# Patient Record
Sex: MALE
[De-identification: names, ages, dates, MRNs, and addresses within clinical notes are randomized per-mention and may not be internally consistent; named-entity substitution may affect disease eponyms.]

---

## 2022-07-08 PROBLEM — Z00.00 ENCOUNTER FOR PREVENTIVE HEALTH EXAMINATION: Status: ACTIVE | Noted: 2022-07-08

## 2022-07-14 ENCOUNTER — APPOINTMENT (OUTPATIENT)
Dept: NEUROSURGERY | Facility: CLINIC | Age: 67
End: 2022-07-14

## 2022-07-14 DIAGNOSIS — M54.50 LOW BACK PAIN, UNSPECIFIED: ICD-10-CM

## 2022-07-14 DIAGNOSIS — G89.29 LOW BACK PAIN, UNSPECIFIED: ICD-10-CM

## 2022-07-14 DIAGNOSIS — Z98.1 ARTHRODESIS STATUS: ICD-10-CM

## 2022-07-14 PROCEDURE — EDU01: CPT

## 2022-07-14 NOTE — REASON FOR VISIT
[Home] : at home, [unfilled] , at the time of the visit. [Medical Office: ([facility])___] : at the medical office located in  [Patient] : the patient

## 2022-07-15 PROBLEM — M54.50 CHRONIC BILATERAL LOW BACK PAIN WITHOUT SCIATICA: Status: ACTIVE | Noted: 2022-07-15

## 2022-07-15 PROBLEM — Z98.1 HISTORY OF FUSION OF THORACIC SPINE: Status: ACTIVE | Noted: 2022-07-15

## 2022-07-15 RX ORDER — ACETAMINOPHEN 500 MG
500 TABLET ORAL
Refills: 0 | Status: ACTIVE | COMMUNITY

## 2022-07-15 RX ORDER — GABAPENTIN 300 MG/1
300 CAPSULE ORAL
Refills: 0 | Status: ACTIVE | COMMUNITY

## 2022-07-15 NOTE — DATA REVIEWED
[de-identified] : Thoracic 6/2022 (care stream) [de-identified] : Thoracic and lumbar 6/2022 (care stream)

## 2022-07-15 NOTE — HISTORY OF PRESENT ILLNESS
[> 3 months] : more  than 3 months [Pain] : pain [Weakness] : weakness [Numbness] : numbness [Other:___] : [unfilled] [Worsening] : worsening [___ yrs] : [unfilled] year(s) ago [Sitting] : sitting [Daily] : ~He/She~ states the symptoms seem to be occuring daily [Bending] : worsened by bending [Lifting] : worsened by lifting [Prolonged Standing] : worsened by prolonged standing [Standing] : worsened by standing [Walking] : worsened by walking [Weight Bearing] : worsened by weight bearing [Recumbency] : relieved by recumbency [Rest] : relieved by rest [FreeTextEntry1] : I conducted a remote educational consult with RODRICK Durbin on 07/15/2022. I explained that I am only able to provide an educational consult and not render treatment as I am not licensed in the state in which [place] is located. A written informed consent for the educational consult was obtained and is included in the EHR. RODRICK Durbin is informed that there would be a $250 cost associated with the conduct of the remote educational consult. Video education consultation for back and leg pain [de-identified] : This is a very pleasant 66 year old male who reports rupturing a lumbar disc in 1984. At this time he had a laminectomy/diskectomy done. He did well until approximately 1994 at which time he developed worsening low back pain , mid back pain and leg pain. At this time He had a Thoracic fusion. He reports that the back pain and leg pain initially improved\par  \par In 2019 he under went a 2 stage revision due to unstable fusion. Stage one consisted of ALIF and stage 2 was a PLIF. Patient reports that he woke up in the recovery room in excruciating pain and from there he was taken back to the operating room immediately where he reports " a screw was removed".\par \par He woke up with improved back pain but minimal leg pain improvement.\par \par He reports that this has greatly impacted his daily activities as prior to the 2 stage fusion he was able to swim 2500 meters per day and ride his bike for miles. Now he is only able to swim 1200 meters and bike for a mile or two. He also notes that if he lifts more than 20 pounds (such as in arm curls) he has instant pain that radiates down his legs and into his feet.\par \par His pain is worse through out the day and is most improved first thing in the morning.\par \par Back pain = leg pan\par \par Patient states that he has a few different surgical opinions and is seeking another one today, prior to making any surgical decisions

## 2022-07-15 NOTE — PLAN
[FreeTextEntry1] : Impression:\par Very pleasant , very active 66 year old male with long history of spinal surgeries, on this video education consult all today seeking a surgical opinion to alleviate/eliminate back and leg pain that is altering his life.\par \par Discussion:\par \par A long discussion was had with the patient and the MRi/CT scan =s where reviewed with him. There are no complete scoli films available at this time. The discussion was centered around which the patient felt that he could live best without, the back pain or the leg pain. Patient states that he wishes for both to be gone. It was explained to him that the back pain could be addressed with a revision surgery, however the nerve pain is more complicated. Since the nerves have already been compromised, they may not return to be 100% pain free.\par \par It was explained to the patient that perhaps the best course of action would be to correct the back pain and spasms with a revision surgery. If he remains with leg pain then he would be best advised to seek pain management via a nerve stimulator.\par \par Plan:\par \par 1) complete scoli films 6 views\par \par 2)fReview scoli films via telehealth\par \par 3) /u once a surgical decision has been made